# Patient Record
Sex: MALE | Race: WHITE | Employment: FULL TIME | URBAN - METROPOLITAN AREA
[De-identification: names, ages, dates, MRNs, and addresses within clinical notes are randomized per-mention and may not be internally consistent; named-entity substitution may affect disease eponyms.]

---

## 2017-04-15 ENCOUNTER — HOSPITAL ENCOUNTER (OUTPATIENT)
Dept: GENERAL RADIOLOGY | Age: 32
Discharge: HOME | End: 2017-04-15
Payer: COMMERCIAL

## 2017-04-15 DIAGNOSIS — M54.2 NECK PAIN: ICD-10-CM

## 2017-04-15 DIAGNOSIS — M54.2 CERVICALGIA: ICD-10-CM

## 2017-04-15 PROCEDURE — 72040 X-RAY EXAM NECK SPINE 2-3 VW: CPT

## 2018-05-04 ENCOUNTER — OFFICE VISIT (OUTPATIENT)
Dept: FAMILY MEDICINE CLINIC | Facility: CLINIC | Age: 33
End: 2018-05-04

## 2018-05-04 VITALS
WEIGHT: 200 LBS | HEART RATE: 89 BPM | BODY MASS INDEX: 28.63 KG/M2 | HEIGHT: 70 IN | OXYGEN SATURATION: 98 % | SYSTOLIC BLOOD PRESSURE: 128 MMHG | TEMPERATURE: 99 F | DIASTOLIC BLOOD PRESSURE: 70 MMHG | RESPIRATION RATE: 20 BRPM

## 2018-05-04 DIAGNOSIS — J02.9 SORE THROAT: ICD-10-CM

## 2018-05-04 DIAGNOSIS — R05.9 COUGH: ICD-10-CM

## 2018-05-04 DIAGNOSIS — J40 BRONCHITIS: ICD-10-CM

## 2018-05-04 DIAGNOSIS — R68.89 FLU-LIKE SYMPTOMS: Primary | ICD-10-CM

## 2018-05-04 PROCEDURE — 99202 OFFICE O/P NEW SF 15 MIN: CPT | Performed by: NURSE PRACTITIONER

## 2018-05-04 PROCEDURE — 87081 CULTURE SCREEN ONLY: CPT | Performed by: NURSE PRACTITIONER

## 2018-05-04 PROCEDURE — 87880 STREP A ASSAY W/OPTIC: CPT | Performed by: NURSE PRACTITIONER

## 2018-05-04 RX ORDER — ALBUTEROL SULFATE 90 UG/1
2 AEROSOL, METERED RESPIRATORY (INHALATION) EVERY 4 HOURS PRN
Qty: 1 INHALER | Refills: 0 | Status: SHIPPED | OUTPATIENT
Start: 2018-05-04 | End: 2018-12-21

## 2018-05-04 NOTE — PROGRESS NOTES
Patient presents with:  URI: chest congestion, chills,fever chest has a burn feeling with coughing x 5 days :  2  HPI:   Andreea Mane is a 27year old male who presents for upper respiratory symptoms for  5  days. Started suddenly.   Symptoms have been HEAD: atraumatic, normocephalic,  no tenderness on palpation of sinuses  EYES: conjunctiva clear, EOM intact  EARS: TM's clear gray, no bulging, no retraction, no fluid, bony landmarks intact  NOSE: nostrils patent, clear nasal mucous, nasal mucosa reddene The patient is asked to return in 3-4 days if sx's persist. Seek immediate medical attention for acute or worsening symptoms. The patient indicates understanding of these issues and agrees to the plan.     Meds & Refills for this Visit:    Reema Washington · You may use over-the-counter medicine to control fever or pain, unless another pain medicine was prescribed. If you have chronic liver or kidney disease or have ever had a stomach ulcer or gastrointestinal bleeding, talk with your healthcare provider bef © 9749-0427 [employer] [address]. [address] [facility], [facility] [place]. All rights reserved. This information is not intended as a substitute for professional medical care. Always follow your healthcare professional's instructions.         Kavita Weller · Stay home until your fever has been gone for at least 24 hours without using medicine to reduce fever. Follow-up care  Follow up with your healthcare provider, or as advised, if you are not getting better over the next week.   If you are age 72 or older,

## 2018-05-04 NOTE — PATIENT INSTRUCTIONS
Viral Bronchitis (Adult)    You have a viral bronchitis. Bronchitis is inflammation and swelling of the lining of the lungs. This is often caused by an infection. Symptoms include a dry, hacking cough that is worse at night.  The cough may bring up yellow · Over-the-counter cough, cold, and sore-throat medicines will not shorten the length of the illness, but they may help to reduce symptoms. Don't use decongestants if you have high blood pressure.   Follow-up care  Follow up with your healthcare provider, o The flu starts 1 to 3 days after you are exposed to the flu virus. It may last for 1 to 2 weeks but many people feel tired or fatigued for many weeks afterward. You usually don’t need to take antibiotics unless you have a complication.  This might be an ear · Cough with lots of colored mucus (sputum) or blood in your mucus  · Chest pain, shortness of breath, wheezing, or trouble breathing  · Severe headache, or face, neck, or ear pain  · New rash with fever  · Fever of 100.4°F (38°C) or higher, or as directed · Ease pain with anesthetic sprays. Aspirin or an aspirin substitute also helps.  Remember, never give aspirin to anyone 25 or younger, or if you are already taking blood thinners.   · For sore throats caused by allergies, try antihistamines to block the al

## 2018-12-12 PROBLEM — F32.A DEPRESSION: Status: ACTIVE | Noted: 2018-12-12

## 2018-12-12 PROBLEM — F12.11 CANNABIS ABUSE, IN REMISSION: Status: ACTIVE | Noted: 2018-12-12

## 2018-12-12 PROBLEM — F10.11 ALCOHOL ABUSE, IN REMISSION: Status: ACTIVE | Noted: 2018-12-12

## 2018-12-12 PROBLEM — F41.9 ANXIETY DISORDER: Status: ACTIVE | Noted: 2018-12-12

## 2018-12-21 PROBLEM — F11.20 OPIOID USE DISORDER, MODERATE, DEPENDENCE (HCC): Status: ACTIVE | Noted: 2018-12-21

## 2018-12-24 ENCOUNTER — LAB ENCOUNTER (OUTPATIENT)
Dept: LAB | Age: 33
End: 2018-12-24
Attending: NURSE PRACTITIONER
Payer: COMMERCIAL

## 2018-12-24 DIAGNOSIS — Z51.81 MEDICATION MONITORING ENCOUNTER: ICD-10-CM

## 2018-12-24 PROCEDURE — 36415 COLL VENOUS BLD VENIPUNCTURE: CPT

## 2018-12-24 PROCEDURE — 85025 COMPLETE CBC W/AUTO DIFF WBC: CPT

## 2018-12-24 PROCEDURE — 84439 ASSAY OF FREE THYROXINE: CPT

## 2018-12-24 PROCEDURE — 82607 VITAMIN B-12: CPT

## 2018-12-24 PROCEDURE — 82746 ASSAY OF FOLIC ACID SERUM: CPT

## 2018-12-24 PROCEDURE — 82306 VITAMIN D 25 HYDROXY: CPT

## 2018-12-24 PROCEDURE — 84443 ASSAY THYROID STIM HORMONE: CPT

## 2018-12-24 PROCEDURE — 80053 COMPREHEN METABOLIC PANEL: CPT

## 2018-12-28 NOTE — PROGRESS NOTES
Vitamin D is low and patient should begin otc supplementation 1000 iu's; Free t4 is somewhat low; patient should have repeat lab in 3-6 months.    All else within normal limits

## 2019-03-01 PROBLEM — F11.21 OPIOID USE DISORDER, MODERATE, IN EARLY REMISSION (HCC): Status: ACTIVE | Noted: 2019-03-01

## 2019-03-01 PROBLEM — F11.21 OPIOID USE DISORDER, SEVERE, IN EARLY REMISSION (HCC): Status: ACTIVE | Noted: 2019-03-01

## 2019-04-25 ENCOUNTER — LAB ENCOUNTER (OUTPATIENT)
Dept: LAB | Age: 34
End: 2019-04-25
Attending: NURSE PRACTITIONER
Payer: COMMERCIAL

## 2019-04-25 DIAGNOSIS — F11.21 OPIOID DEPENDENCE IN REMISSION (HCC): ICD-10-CM

## 2019-04-25 PROCEDURE — 80307 DRUG TEST PRSMV CHEM ANLYZR: CPT

## 2019-05-01 PROBLEM — F11.21 OPIOID DEPENDENCE IN REMISSION (HCC): Status: ACTIVE | Noted: 2018-12-21

## 2019-06-26 ENCOUNTER — LAB ENCOUNTER (OUTPATIENT)
Dept: LAB | Age: 34
End: 2019-06-26
Attending: PODIATRIST
Payer: COMMERCIAL

## 2019-06-26 DIAGNOSIS — Z51.81 MEDICATION MONITORING ENCOUNTER: ICD-10-CM

## 2019-06-26 PROCEDURE — 80307 DRUG TEST PRSMV CHEM ANLYZR: CPT

## 2019-10-23 ENCOUNTER — APPOINTMENT (OUTPATIENT)
Dept: LAB | Age: 34
End: 2019-10-23
Attending: NURSE PRACTITIONER
Payer: COMMERCIAL

## 2019-10-23 DIAGNOSIS — Z51.81 MEDICATION MONITORING ENCOUNTER: ICD-10-CM

## 2019-10-23 PROCEDURE — 80307 DRUG TEST PRSMV CHEM ANLYZR: CPT

## 2019-12-23 ENCOUNTER — LAB ENCOUNTER (OUTPATIENT)
Dept: LAB | Facility: HOSPITAL | Age: 34
End: 2019-12-23
Attending: NURSE PRACTITIONER
Payer: COMMERCIAL

## 2019-12-23 DIAGNOSIS — Z51.81 MEDICATION MONITORING ENCOUNTER: ICD-10-CM

## 2019-12-23 PROCEDURE — 80076 HEPATIC FUNCTION PANEL: CPT

## 2019-12-23 PROCEDURE — 36415 COLL VENOUS BLD VENIPUNCTURE: CPT

## 2019-12-23 PROCEDURE — 84439 ASSAY OF FREE THYROXINE: CPT

## 2019-12-23 PROCEDURE — 84443 ASSAY THYROID STIM HORMONE: CPT

## 2020-08-21 ENCOUNTER — OFFICE VISIT (OUTPATIENT)
Dept: FAMILY MEDICINE CLINIC | Facility: CLINIC | Age: 35
End: 2020-08-21
Payer: COMMERCIAL

## 2020-08-21 VITALS
HEART RATE: 91 BPM | WEIGHT: 195 LBS | RESPIRATION RATE: 18 BRPM | DIASTOLIC BLOOD PRESSURE: 70 MMHG | OXYGEN SATURATION: 97 % | HEIGHT: 70 IN | BODY MASS INDEX: 27.92 KG/M2 | TEMPERATURE: 98 F | SYSTOLIC BLOOD PRESSURE: 140 MMHG

## 2020-08-21 DIAGNOSIS — L23.7 POISON IVY DERMATITIS: Primary | ICD-10-CM

## 2020-08-21 PROCEDURE — 3008F BODY MASS INDEX DOCD: CPT | Performed by: NURSE PRACTITIONER

## 2020-08-21 PROCEDURE — 3078F DIAST BP <80 MM HG: CPT | Performed by: NURSE PRACTITIONER

## 2020-08-21 PROCEDURE — 3077F SYST BP >= 140 MM HG: CPT | Performed by: NURSE PRACTITIONER

## 2020-08-21 PROCEDURE — 99213 OFFICE O/P EST LOW 20 MIN: CPT | Performed by: NURSE PRACTITIONER

## 2020-08-21 RX ORDER — PREDNISONE 10 MG/1
TABLET ORAL
Qty: 28 TABLET | Refills: 0 | Status: SHIPPED | OUTPATIENT
Start: 2020-08-21 | End: 2020-08-31

## 2020-08-21 NOTE — PROGRESS NOTES
CHIEF COMPLAINT:   Patient presents with:  Rash     HPI:    Fany Jeffers is a 38year old male who presents for evaluation of a rash. Per patient rash started in the past 5 days. Rash has been worsening since onset.   Patient denies had similar rash in • Alcohol and Other Disorders Associated Father    • Depression Other         pat aunt, pat female cousin, mat grt gf   • Alcohol and Other Disorders Associated Other         pat grt gf, pat male cousins   • Suicide History Other         completed mat grt LYMPH: No lymphadenopathy. ASSESSMENT AND PLAN:   Guevara Kohler is a 38year old male who presents for evaluation of a rash. Findings are consistent with:    ASSESSMENT:  Poison ivy dermatitis  (primary encounter diagnosis)     1.  Poison ivy dermati Poison oak, poison ivy, and poison sumac thrive mainly in unmaintained outdoor areas. If you touch these plants, you may get a rash. You may also react if you touch something that came in contact with urushiol.  This could be a dog or cat, clothing, or equi If you think you may have come in contact with the sap oil (urushiol) contained in poison ivy, poison oak, or poison sumac plants, it's important to wash the affected part of your skin as soon as possible to remove the sap oil or help prevent further sprea When to call your healthcare provider  Call your healthcare provider or seek immediate medical attention if:   · Your rash is severe  · The rash spreads beyond the exposed part of your body or affects your face.   · The rash does not clear up within a few w

## 2020-08-21 NOTE — PATIENT INSTRUCTIONS
Avoiding Poison Ivy, [address], and [employer], poison ivy, and poison sumac are plants that can cause skin rashes. The problem is a sap oil called urushiol that is contained in these plants.  If you're allergic to urushiol, touching one of · Wash clothing and animals that come in contact with these plants as well. Urushiol may stay on them and cause a rash when you touch them in the future. Moni last reviewed this educational content on 3/1/2017  © 3769-4899 [employer] [address].  8 · Use over-the-counter treatments on your skin. These include cortisone creams and calamine lotion. How your skin may react  A mild rash may become red, swollen, and itchy. The rash may form a line on your skin where you brushed against the plant.  If you

## 2020-09-01 ENCOUNTER — HOSPITAL ENCOUNTER (EMERGENCY)
Age: 35
Discharge: HOME | End: 2020-09-01
Attending: EMERGENCY MEDICINE
Payer: COMMERCIAL

## 2020-09-01 ENCOUNTER — APPOINTMENT (OUTPATIENT)
Dept: CT IMAGING | Age: 35
End: 2020-09-01
Attending: EMERGENCY MEDICINE
Payer: COMMERCIAL

## 2020-09-01 VITALS
HEART RATE: 83 BPM | RESPIRATION RATE: 18 BRPM | WEIGHT: 195 LBS | OXYGEN SATURATION: 99 % | BODY MASS INDEX: 27.92 KG/M2 | TEMPERATURE: 99 F | SYSTOLIC BLOOD PRESSURE: 125 MMHG | DIASTOLIC BLOOD PRESSURE: 75 MMHG | HEIGHT: 70 IN

## 2020-09-01 DIAGNOSIS — A09 DYSENTERY: Primary | ICD-10-CM

## 2020-09-01 DIAGNOSIS — K52.9 COLITIS: ICD-10-CM

## 2020-09-01 LAB
ALBUMIN SERPL-MCNC: 4 G/DL (ref 3.4–5)
ALBUMIN/GLOB SERPL: 1.2 {RATIO} (ref 1–2)
ALP LIVER SERPL-CCNC: 81 U/L (ref 45–117)
ALT SERPL-CCNC: 25 U/L (ref 16–61)
ANION GAP SERPL CALC-SCNC: 2 MMOL/L (ref 0–18)
AST SERPL-CCNC: 18 U/L (ref 15–37)
BASOPHILS # BLD AUTO: 0.03 X10(3) UL (ref 0–0.2)
BASOPHILS NFR BLD AUTO: 0.2 %
BILIRUB SERPL-MCNC: 0.5 MG/DL (ref 0.1–2)
BILIRUB UR QL STRIP.AUTO: NEGATIVE
BUN BLD-MCNC: 7 MG/DL (ref 7–18)
BUN/CREAT SERPL: 8 (ref 10–20)
CALCIUM BLD-MCNC: 8.9 MG/DL (ref 8.5–10.1)
CHLORIDE SERPL-SCNC: 104 MMOL/L (ref 98–112)
CLARITY UR REFRACT.AUTO: CLEAR
CO2 SERPL-SCNC: 32 MMOL/L (ref 21–32)
COLOR UR AUTO: YELLOW
CREAT BLD-MCNC: 0.88 MG/DL (ref 0.7–1.3)
DEPRECATED RDW RBC AUTO: 40.9 FL (ref 35.1–46.3)
EOSINOPHIL # BLD AUTO: 0.27 X10(3) UL (ref 0–0.7)
EOSINOPHIL NFR BLD AUTO: 2 %
ERYTHROCYTE [DISTWIDTH] IN BLOOD BY AUTOMATED COUNT: 12.2 % (ref 11–15)
GLOBULIN PLAS-MCNC: 3.3 G/DL (ref 2.8–4.4)
GLUCOSE BLD-MCNC: 106 MG/DL (ref 70–99)
GLUCOSE UR STRIP.AUTO-MCNC: NEGATIVE MG/DL
HCT VFR BLD AUTO: 46.8 % (ref 39–53)
HGB BLD-MCNC: 15.7 G/DL (ref 13–17.5)
IMM GRANULOCYTES # BLD AUTO: 0.1 X10(3) UL (ref 0–1)
IMM GRANULOCYTES NFR BLD: 0.7 %
KETONES UR STRIP.AUTO-MCNC: NEGATIVE MG/DL
LEUKOCYTE ESTERASE UR QL STRIP.AUTO: NEGATIVE
LYMPHOCYTES # BLD AUTO: 1.13 X10(3) UL (ref 1–4)
LYMPHOCYTES NFR BLD AUTO: 8.3 %
M PROTEIN MFR SERPL ELPH: 7.3 G/DL (ref 6.4–8.2)
MCH RBC QN AUTO: 30.8 PG (ref 26–34)
MCHC RBC AUTO-ENTMCNC: 33.5 G/DL (ref 31–37)
MCV RBC AUTO: 91.8 FL (ref 80–100)
MONOCYTES # BLD AUTO: 1.04 X10(3) UL (ref 0.1–1)
MONOCYTES NFR BLD AUTO: 7.7 %
NEUTROPHILS # BLD AUTO: 10.98 X10 (3) UL (ref 1.5–7.7)
NEUTROPHILS # BLD AUTO: 10.98 X10(3) UL (ref 1.5–7.7)
NEUTROPHILS NFR BLD AUTO: 81.1 %
NITRITE UR QL STRIP.AUTO: NEGATIVE
OSMOLALITY SERPL CALC.SUM OF ELEC: 284 MOSM/KG (ref 275–295)
PH UR STRIP.AUTO: 7 [PH] (ref 4.5–8)
PLATELET # BLD AUTO: 284 10(3)UL (ref 150–450)
POTASSIUM SERPL-SCNC: 3.5 MMOL/L (ref 3.5–5.1)
PROT UR STRIP.AUTO-MCNC: NEGATIVE MG/DL
RBC # BLD AUTO: 5.1 X10(6)UL (ref 4.3–5.7)
RBC UR QL AUTO: NEGATIVE
SODIUM SERPL-SCNC: 138 MMOL/L (ref 136–145)
SP GR UR STRIP.AUTO: 1.01 (ref 1–1.03)
UROBILINOGEN UR STRIP.AUTO-MCNC: 0.2 MG/DL
WBC # BLD AUTO: 13.6 X10(3) UL (ref 4–11)

## 2020-09-01 PROCEDURE — 96365 THER/PROPH/DIAG IV INF INIT: CPT

## 2020-09-01 PROCEDURE — 96367 TX/PROPH/DG ADDL SEQ IV INF: CPT

## 2020-09-01 PROCEDURE — 81003 URINALYSIS AUTO W/O SCOPE: CPT | Performed by: EMERGENCY MEDICINE

## 2020-09-01 PROCEDURE — 99284 EMERGENCY DEPT VISIT MOD MDM: CPT

## 2020-09-01 PROCEDURE — 99285 EMERGENCY DEPT VISIT HI MDM: CPT

## 2020-09-01 PROCEDURE — 74177 CT ABD & PELVIS W/CONTRAST: CPT | Performed by: EMERGENCY MEDICINE

## 2020-09-01 PROCEDURE — 85025 COMPLETE CBC W/AUTO DIFF WBC: CPT | Performed by: EMERGENCY MEDICINE

## 2020-09-01 PROCEDURE — 80053 COMPREHEN METABOLIC PANEL: CPT | Performed by: EMERGENCY MEDICINE

## 2020-09-01 PROCEDURE — 96361 HYDRATE IV INFUSION ADD-ON: CPT

## 2020-09-01 RX ORDER — ONDANSETRON 8 MG/1
8 TABLET, ORALLY DISINTEGRATING ORAL EVERY 6 HOURS PRN
Qty: 10 TABLET | Refills: 0 | Status: SHIPPED | OUTPATIENT
Start: 2020-09-01 | End: 2021-07-01 | Stop reason: ALTCHOICE

## 2020-09-01 RX ORDER — ONDANSETRON 2 MG/ML
4 INJECTION INTRAMUSCULAR; INTRAVENOUS
Status: DISCONTINUED | OUTPATIENT
Start: 2020-09-01 | End: 2020-09-02

## 2020-09-01 RX ORDER — LEVOFLOXACIN 5 MG/ML
500 INJECTION, SOLUTION INTRAVENOUS ONCE
Status: COMPLETED | OUTPATIENT
Start: 2020-09-01 | End: 2020-09-01

## 2020-09-01 RX ORDER — METRONIDAZOLE 500 MG/100ML
500 INJECTION, SOLUTION INTRAVENOUS ONCE
Status: COMPLETED | OUTPATIENT
Start: 2020-09-01 | End: 2020-09-01

## 2020-09-01 RX ORDER — LEVOFLOXACIN 500 MG/1
500 TABLET, FILM COATED ORAL DAILY
Qty: 10 TABLET | Refills: 0 | Status: SHIPPED | OUTPATIENT
Start: 2020-09-01 | End: 2020-09-11

## 2020-09-01 RX ORDER — METRONIDAZOLE 500 MG/1
500 TABLET ORAL 2 TIMES DAILY
Qty: 20 TABLET | Refills: 0 | Status: SHIPPED | OUTPATIENT
Start: 2020-09-01 | End: 2020-09-11

## 2020-09-01 NOTE — ED INITIAL ASSESSMENT (HPI)
Lower abd pain with bright red rectal bleeding onset last noc. +vomiting, denies any blood in vomit.

## 2020-09-01 NOTE — ED PROVIDER NOTES
Patient Seen in: [facility] Emergency Department In [place]      History   Patient presents with:  Abdominal Pain  GI Bleeding    Stated Complaint: lower abd pain with fresh blood in stools since last night.     HPI    Since last night, patient has had abo BMI 27.98 kg/m²         Physical Exam  General: The patient is awake, alert, conversant. Patient answers questions quickly and appropriately. Eyes: sclera white. Lids and lashes are normal.  Neck: Supple  Lungs: Clear to auscultation bilaterally.   No rh STOOL CULTURE W/SHIGATOXIN                  MDM     Patient complains of nausea, vomiting, and diarrhea. His diarrhea has become bloody. I suspect infectious gastroenteritis.   Bacterial infectious gastroenteritis is considered with the bloody diarrhea Tab  Take 1 tablet (500 mg total) by mouth daily for 10 days.   Qty: 10 tablet Refills: 0

## 2021-05-01 ENCOUNTER — HOSPITAL ENCOUNTER (OUTPATIENT)
Dept: ULTRASOUND IMAGING | Age: 36
Discharge: HOME | End: 2021-05-01
Attending: PHYSICIAN ASSISTANT
Payer: COMMERCIAL

## 2021-05-01 DIAGNOSIS — R59.9 ENLARGED LYMPH NODES, UNSPECIFIED: ICD-10-CM

## 2021-05-01 PROCEDURE — 76536 US EXAM OF HEAD AND NECK: CPT | Performed by: PHYSICIAN ASSISTANT

## 2021-07-01 ENCOUNTER — OFFICE VISIT (OUTPATIENT)
Dept: NEUROLOGY | Facility: CLINIC | Age: 36
End: 2021-07-01
Payer: COMMERCIAL

## 2021-07-01 ENCOUNTER — LAB ENCOUNTER (OUTPATIENT)
Dept: LAB | Age: 36
End: 2021-07-01
Attending: Other
Payer: COMMERCIAL

## 2021-07-01 VITALS
HEART RATE: 80 BPM | WEIGHT: 202 LBS | DIASTOLIC BLOOD PRESSURE: 64 MMHG | BODY MASS INDEX: 29 KG/M2 | RESPIRATION RATE: 16 BRPM | SYSTOLIC BLOOD PRESSURE: 118 MMHG

## 2021-07-01 DIAGNOSIS — G44.52 NEW PERSISTENT DAILY HEADACHE: ICD-10-CM

## 2021-07-01 DIAGNOSIS — R51.9 HEADACHE DISORDER: Primary | ICD-10-CM

## 2021-07-01 LAB — SED RATE-ML: 7 MM/HR

## 2021-07-01 PROCEDURE — 3078F DIAST BP <80 MM HG: CPT | Performed by: OTHER

## 2021-07-01 PROCEDURE — 36415 COLL VENOUS BLD VENIPUNCTURE: CPT

## 2021-07-01 PROCEDURE — 85652 RBC SED RATE AUTOMATED: CPT

## 2021-07-01 PROCEDURE — 3074F SYST BP LT 130 MM HG: CPT | Performed by: OTHER

## 2021-07-01 PROCEDURE — 99244 OFF/OP CNSLTJ NEW/EST MOD 40: CPT | Performed by: OTHER

## 2021-07-01 RX ORDER — BUTALBITAL, ACETAMINOPHEN AND CAFFEINE 50; 325; 40 MG/1; MG/1; MG/1
1 TABLET ORAL EVERY 4 HOURS PRN
Qty: 12 TABLET | Refills: 0 | Status: SHIPPED | OUTPATIENT
Start: 2021-07-01

## 2021-07-01 NOTE — PROGRESS NOTES
[facility]    Date of consult: 7/1/2021    CC/Reason for consult: worsening headache  Consult Requested by  Naomi Bui MD    HPI: Andreea Mane is a 43year old male with past medical history as listed below presents here Diagnosis Date   • Allergic rhinitis    • Anxiety    • Depression    • Esophageal reflux    • Sleep apnea      Past Surgical History:   Procedure Laterality Date   • WISDOM TEETH REMOVED       Social History:  Social History    Tobacco Use      Smoking s 7/1/2021    Assessment & Plan:  (G44.52) New persistent daily headache; changing pattern of headache in a pt with history of episodic migraine, however, feature of this new headache is not consistent with migraine, thus needs MRI, MRA head to rule out intr

## 2021-07-14 ENCOUNTER — HOSPITAL ENCOUNTER (OUTPATIENT)
Dept: MRI IMAGING | Age: 36
Discharge: HOME | End: 2021-07-14
Attending: Other
Payer: COMMERCIAL

## 2021-07-14 DIAGNOSIS — G44.52 NEW PERSISTENT DAILY HEADACHE: ICD-10-CM

## 2021-07-14 DIAGNOSIS — R51.9 HEADACHE DISORDER: ICD-10-CM

## 2021-07-14 PROCEDURE — 70544 MR ANGIOGRAPHY HEAD W/O DYE: CPT | Performed by: OTHER

## 2021-07-14 PROCEDURE — 70551 MRI BRAIN STEM W/O DYE: CPT | Performed by: OTHER

## 2021-08-03 ENCOUNTER — OFFICE VISIT (OUTPATIENT)
Dept: NEUROLOGY | Facility: CLINIC | Age: 36
End: 2021-08-03
Payer: COMMERCIAL

## 2021-08-03 VITALS — DIASTOLIC BLOOD PRESSURE: 62 MMHG | RESPIRATION RATE: 16 BRPM | SYSTOLIC BLOOD PRESSURE: 112 MMHG | HEART RATE: 60 BPM

## 2021-08-03 DIAGNOSIS — G44.52 NEW PERSISTENT DAILY HEADACHE: Primary | ICD-10-CM

## 2021-08-03 PROCEDURE — 99214 OFFICE O/P EST MOD 30 MIN: CPT | Performed by: OTHER

## 2021-08-03 PROCEDURE — 3078F DIAST BP <80 MM HG: CPT | Performed by: OTHER

## 2021-08-03 PROCEDURE — 3074F SYST BP LT 130 MM HG: CPT | Performed by: OTHER

## 2021-08-03 RX ORDER — DIVALPROEX SODIUM 250 MG/1
250 TABLET, EXTENDED RELEASE ORAL NIGHTLY
Qty: 90 TABLET | Refills: 1 | Status: SHIPPED | OUTPATIENT
Start: 2021-08-03 | End: 2022-01-14

## 2021-08-03 NOTE — PROGRESS NOTES
[facility] Neurology Outpatient Progress Note  Date of service: 8/3/2021    Patient here for a follow-up visit for headache, Since last visit headache is about same. MRI, MRA brain was negative.   He is willing to try different preventive medication such as depako INTRAMUSCULAR INJECTION INTO A GLUTEAL MUSCLE, ALTERNATING BUTTOCKS PER MONTHLY INJECTION. KEEP, Disp: , Rfl: 5  •  famoTIDine 20 MG Oral Tab, Take 20 mg by mouth 2 (two) times daily. , Disp: , Rfl:   •  Fexofenadine HCl 180 MG Oral Tab, Take 180 mg by mout then 500 mg ~750 mg daily if able to tolerate  Headache diary   Pt does want to cont Fioricet   follow up on SUKHJINDER treatment   See orders and medications filed with this encounter. The patient indicates understanding of these issues and agrees with the plan.

## 2021-09-16 ENCOUNTER — ORDER TRANSCRIPTION (OUTPATIENT)
Dept: ADMINISTRATIVE | Facility: HOSPITAL | Age: 36
End: 2021-09-16

## 2021-09-16 DIAGNOSIS — Z01.818 PREOP EXAMINATION: Primary | ICD-10-CM

## 2021-09-25 ENCOUNTER — LAB ENCOUNTER (OUTPATIENT)
Dept: LAB | Age: 36
End: 2021-09-25
Attending: OTOLARYNGOLOGY
Payer: COMMERCIAL

## 2021-09-25 DIAGNOSIS — Z01.818 PREOP EXAMINATION: ICD-10-CM

## 2021-09-27 LAB — SARS-COV-2 RNA RESP QL NAA+PROBE: NOT DETECTED

## 2021-09-28 ENCOUNTER — HOSPITAL ENCOUNTER (OUTPATIENT)
Dept: ULTRASOUND IMAGING | Facility: HOSPITAL | Age: 36
Discharge: HOME | End: 2021-09-28
Attending: OTOLARYNGOLOGY
Payer: COMMERCIAL

## 2021-09-28 ENCOUNTER — HOSPITAL ENCOUNTER (OUTPATIENT)
Dept: ULTRASOUND IMAGING | Facility: HOSPITAL | Age: 36
End: 2021-09-28
Attending: OTOLARYNGOLOGY
Payer: COMMERCIAL

## 2021-09-28 DIAGNOSIS — R22.1 NECK MASS: ICD-10-CM

## 2021-09-28 PROCEDURE — 88185 FLOWCYTOMETRY/TC ADD-ON: CPT | Performed by: OTOLARYNGOLOGY

## 2021-09-28 PROCEDURE — 88184 FLOWCYTOMETRY/ TC 1 MARKER: CPT | Performed by: OTOLARYNGOLOGY

## 2021-09-28 PROCEDURE — 10005 FNA BX W/US GDN 1ST LES: CPT | Performed by: OTOLARYNGOLOGY

## 2021-09-28 PROCEDURE — 88173 CYTOPATH EVAL FNA REPORT: CPT | Performed by: OTOLARYNGOLOGY

## 2021-10-01 NOTE — PROGRESS NOTES
Please call patient. FNA preliminary benign. Sent for additional test, flow cytometry and await result.

## 2021-10-05 NOTE — PROGRESS NOTES
Please call patient. Flow cytometry negative for malignant features. Recheck in 6 months, sooner if issues.

## 2021-10-05 NOTE — PROGRESS NOTES
Called and left detailed vm informed of result note. Neal Newsome MD   10/4/2021 10:05 PM CDT Back to Top      Recheck meaning follow up with me if office for exam in 6 months. Neal Newsome MD   10/4/2021 10:03 PM CDT       Please call patient.   Flow

## 2022-01-13 DIAGNOSIS — R51.9 HEADACHE DISORDER: Primary | ICD-10-CM

## 2022-01-14 NOTE — TELEPHONE ENCOUNTER
Medication: DIVALPROEX SODIUM  MG Oral Tablet 24 Hr     Date of last refill: 08/03/2021 (#90/1)  [place]     Last office visit: 08/03/2021  Due back to clinic per last office note:  Around 02/03/2022  Date next

## 2022-01-15 RX ORDER — DIVALPROEX SODIUM 250 MG/1
250 TABLET, EXTENDED RELEASE ORAL NIGHTLY
Qty: 90 TABLET | Refills: 0 | Status: SHIPPED | OUTPATIENT
Start: 2022-01-15

## 2022-04-12 RX ORDER — DIVALPROEX SODIUM 250 MG/1
TABLET, EXTENDED RELEASE ORAL
Qty: 30 TABLET | Refills: 0 | Status: SHIPPED | OUTPATIENT
Start: 2022-04-12

## 2023-03-13 ENCOUNTER — HOSPITAL ENCOUNTER (EMERGENCY)
Age: 38
Discharge: HOME | End: 2023-03-13
Attending: EMERGENCY MEDICINE
Payer: COMMERCIAL

## 2023-03-13 VITALS
RESPIRATION RATE: 16 BRPM | SYSTOLIC BLOOD PRESSURE: 124 MMHG | TEMPERATURE: 98 F | HEART RATE: 92 BPM | HEIGHT: 70 IN | WEIGHT: 215 LBS | DIASTOLIC BLOOD PRESSURE: 73 MMHG | OXYGEN SATURATION: 97 % | BODY MASS INDEX: 30.78 KG/M2

## 2023-03-13 DIAGNOSIS — R00.2 PALPITATIONS: Primary | ICD-10-CM

## 2023-03-13 LAB
ALBUMIN SERPL-MCNC: 3.8 G/DL (ref 3.4–5)
ALBUMIN/GLOB SERPL: 1.1 {RATIO} (ref 1–2)
ALP LIVER SERPL-CCNC: 81 U/L
ALT SERPL-CCNC: 48 U/L
ANION GAP SERPL CALC-SCNC: 5 MMOL/L (ref 0–18)
AST SERPL-CCNC: 30 U/L (ref 15–37)
BASOPHILS # BLD AUTO: 0.04 X10(3) UL (ref 0–0.2)
BASOPHILS NFR BLD AUTO: 0.6 %
BILIRUB SERPL-MCNC: 0.3 MG/DL (ref 0.1–2)
BUN BLD-MCNC: 8 MG/DL (ref 7–18)
CALCIUM BLD-MCNC: 8.9 MG/DL (ref 8.5–10.1)
CHLORIDE SERPL-SCNC: 104 MMOL/L (ref 98–112)
CO2 SERPL-SCNC: 30 MMOL/L (ref 21–32)
CREAT BLD-MCNC: 1.07 MG/DL
EOSINOPHIL # BLD AUTO: 0.13 X10(3) UL (ref 0–0.7)
EOSINOPHIL NFR BLD AUTO: 2 %
ERYTHROCYTE [DISTWIDTH] IN BLOOD BY AUTOMATED COUNT: 12.5 %
GFR SERPLBLD BASED ON 1.73 SQ M-ARVRAT: 91 ML/MIN/1.73M2 (ref 60–?)
GLOBULIN PLAS-MCNC: 3.4 G/DL (ref 2.8–4.4)
GLUCOSE BLD-MCNC: 120 MG/DL (ref 70–99)
HCT VFR BLD AUTO: 43.5 %
HGB BLD-MCNC: 15.3 G/DL
IMM GRANULOCYTES # BLD AUTO: 0.12 X10(3) UL (ref 0–1)
IMM GRANULOCYTES NFR BLD: 1.9 %
LYMPHOCYTES # BLD AUTO: 1.35 X10(3) UL (ref 1–4)
LYMPHOCYTES NFR BLD AUTO: 21.2 %
MCH RBC QN AUTO: 30.8 PG (ref 26–34)
MCHC RBC AUTO-ENTMCNC: 35.2 G/DL (ref 31–37)
MCV RBC AUTO: 87.7 FL
MONOCYTES # BLD AUTO: 0.62 X10(3) UL (ref 0.1–1)
MONOCYTES NFR BLD AUTO: 9.7 %
NEUTROPHILS # BLD AUTO: 4.11 X10 (3) UL (ref 1.5–7.7)
NEUTROPHILS # BLD AUTO: 4.11 X10(3) UL (ref 1.5–7.7)
NEUTROPHILS NFR BLD AUTO: 64.6 %
OSMOLALITY SERPL CALC.SUM OF ELEC: 288 MOSM/KG (ref 275–295)
PLATELET # BLD AUTO: 311 10(3)UL (ref 150–450)
POTASSIUM SERPL-SCNC: 3.5 MMOL/L (ref 3.5–5.1)
PROT SERPL-MCNC: 7.2 G/DL (ref 6.4–8.2)
RBC # BLD AUTO: 4.96 X10(6)UL
SODIUM SERPL-SCNC: 139 MMOL/L (ref 136–145)
TROPONIN I HIGH SENSITIVITY: 4 NG/L
WBC # BLD AUTO: 6.4 X10(3) UL (ref 4–11)

## 2023-03-13 PROCEDURE — 93005 ELECTROCARDIOGRAM TRACING: CPT

## 2023-03-13 PROCEDURE — 99284 EMERGENCY DEPT VISIT MOD MDM: CPT | Performed by: EMERGENCY MEDICINE

## 2023-03-13 PROCEDURE — 84484 ASSAY OF TROPONIN QUANT: CPT | Performed by: EMERGENCY MEDICINE

## 2023-03-13 PROCEDURE — 85025 COMPLETE CBC W/AUTO DIFF WBC: CPT | Performed by: EMERGENCY MEDICINE

## 2023-03-13 PROCEDURE — 36415 COLL VENOUS BLD VENIPUNCTURE: CPT | Performed by: EMERGENCY MEDICINE

## 2023-03-13 PROCEDURE — 80053 COMPREHEN METABOLIC PANEL: CPT | Performed by: EMERGENCY MEDICINE

## 2023-03-13 PROCEDURE — 93010 ELECTROCARDIOGRAM REPORT: CPT | Performed by: EMERGENCY MEDICINE

## 2023-03-14 LAB
ATRIAL RATE: 99 BPM
P-R INTERVAL: 142 MS
Q-T INTERVAL: 338 MS
QRS DURATION: 88 MS
QTC CALCULATION (BEZET): 433 MS
R AXIS: 133 DEGREES
T AXIS: 136 DEGREES
VENTRICULAR RATE: 99 BPM

## 2023-03-14 NOTE — ED INITIAL ASSESSMENT (HPI)
Palpitations since this morning, increasing. Patient initially though anxiety due to new job. Denies pain.

## 2023-03-15 ENCOUNTER — HOSPITAL ENCOUNTER (OUTPATIENT)
Dept: CV DIAGNOSTICS | Facility: HOSPITAL | Age: 38
Discharge: HOME | End: 2023-03-15
Attending: EMERGENCY MEDICINE
Payer: COMMERCIAL

## 2023-03-15 DIAGNOSIS — R00.2 PALPITATIONS: ICD-10-CM

## 2023-03-15 PROCEDURE — 93226 XTRNL ECG REC<48 HR SCAN A/R: CPT | Performed by: EMERGENCY MEDICINE

## 2023-03-15 PROCEDURE — 93225 XTRNL ECG REC<48 HRS REC: CPT | Performed by: EMERGENCY MEDICINE

## 2024-03-13 PROBLEM — Z83.719 FAMILY HISTORY OF COLONIC POLYPS: Status: ACTIVE | Noted: 2024-03-13

## 2024-03-13 PROBLEM — K59.00 CONSTIPATION: Status: ACTIVE | Noted: 2024-03-13

## 2024-03-13 PROBLEM — R12 CHRONIC HEARTBURN: Status: ACTIVE | Noted: 2024-03-13

## 2024-04-10 PROBLEM — G47.33 OBSTRUCTIVE SLEEP APNEA (ADULT) (PEDIATRIC): Status: ACTIVE | Noted: 2024-04-10

## 2024-05-28 ENCOUNTER — LAB ENCOUNTER (OUTPATIENT)
Dept: LAB | Age: 39
End: 2024-05-28
Attending: NURSE PRACTITIONER
Payer: COMMERCIAL

## 2024-05-28 DIAGNOSIS — R74.01 ELEVATED ALT MEASUREMENT: ICD-10-CM

## 2024-05-28 LAB
ALBUMIN SERPL-MCNC: 3.9 G/DL (ref 3.4–5)
ALP LIVER SERPL-CCNC: 115 U/L
ALT SERPL-CCNC: 55 U/L
AST SERPL-CCNC: 28 U/L (ref 15–37)
BILIRUB DIRECT SERPL-MCNC: 0.1 MG/DL (ref 0–0.2)
BILIRUB SERPL-MCNC: 0.4 MG/DL (ref 0.1–2)
PROT SERPL-MCNC: 7.4 G/DL (ref 6.4–8.2)

## 2024-05-28 PROCEDURE — 80076 HEPATIC FUNCTION PANEL: CPT

## 2024-05-28 PROCEDURE — 36415 COLL VENOUS BLD VENIPUNCTURE: CPT

## 2024-07-12 ENCOUNTER — LAB ENCOUNTER (OUTPATIENT)
Dept: LAB | Age: 39
End: 2024-07-12
Attending: STUDENT IN AN ORGANIZED HEALTH CARE EDUCATION/TRAINING PROGRAM
Payer: COMMERCIAL

## 2024-07-12 ENCOUNTER — HOSPITAL ENCOUNTER (OUTPATIENT)
Dept: GENERAL RADIOLOGY | Age: 39
Discharge: HOME | End: 2024-07-12
Attending: PHYSICIAN ASSISTANT
Payer: COMMERCIAL

## 2024-07-12 DIAGNOSIS — R50.9 FEVER, UNSPECIFIED: ICD-10-CM

## 2024-07-12 DIAGNOSIS — R74.01 ELEVATED ALT MEASUREMENT: ICD-10-CM

## 2024-07-12 LAB
A1AT SERPL-MCNC: 158 MG/DL (ref 90–200)
DEPRECATED HBV CORE AB SER IA-ACNC: 114.7 NG/ML
HAV IGM SER QL: NONREACTIVE
HBV CORE IGM SER QL: NONREACTIVE
HBV SURFACE AG SERPL QL IA: NONREACTIVE
HCV AB SERPL QL IA: NONREACTIVE
IGA SERPL-MCNC: 268 MG/DL (ref 70–312)
INR BLD: 0.95 (ref 0.8–1.2)
IRON SATN MFR SERPL: 21 %
IRON SERPL-MCNC: 86 UG/DL
PROTHROMBIN TIME: 12.6 SECONDS (ref 11.6–14.8)
TIBC SERPL-MCNC: 410 UG/DL (ref 240–450)
TRANSFERRIN SERPL-MCNC: 275 MG/DL (ref 200–360)

## 2024-07-12 PROCEDURE — 86364 TISS TRNSGLTMNASE EA IG CLAS: CPT

## 2024-07-12 PROCEDURE — 86038 ANTINUCLEAR ANTIBODIES: CPT

## 2024-07-12 PROCEDURE — 83540 ASSAY OF IRON: CPT

## 2024-07-12 PROCEDURE — 82103 ALPHA-1-ANTITRYPSIN TOTAL: CPT

## 2024-07-12 PROCEDURE — 71046 X-RAY EXAM CHEST 2 VIEWS: CPT | Performed by: PHYSICIAN ASSISTANT

## 2024-07-12 PROCEDURE — 83516 IMMUNOASSAY NONANTIBODY: CPT

## 2024-07-12 PROCEDURE — 82784 ASSAY IGA/IGD/IGG/IGM EACH: CPT

## 2024-07-12 PROCEDURE — 83550 IRON BINDING TEST: CPT

## 2024-07-12 PROCEDURE — 85610 PROTHROMBIN TIME: CPT

## 2024-07-12 PROCEDURE — 80074 ACUTE HEPATITIS PANEL: CPT

## 2024-07-12 PROCEDURE — 82728 ASSAY OF FERRITIN: CPT

## 2024-07-12 PROCEDURE — 36415 COLL VENOUS BLD VENIPUNCTURE: CPT

## 2024-07-13 LAB
ACTIN SMOOTH MUSCLE AB: 26 UNITS
M2 MITOCHONDRIAL AB: <20 UNITS

## 2024-07-16 LAB — NUCLEAR IGG TITR SER IF: POSITIVE {TITER}

## 2024-07-17 LAB — ANA NUCLEOLAR TITR SER IF: 320 {TITER}

## 2024-07-18 LAB — TTG IGA SER-ACNC: 0.8 U/ML (ref ?–7)

## 2025-02-03 ENCOUNTER — HOSPITAL ENCOUNTER (OUTPATIENT)
Dept: CV DIAGNOSTICS | Facility: HOSPITAL | Age: 40
Discharge: HOME | End: 2025-02-03
Attending: PHYSICIAN ASSISTANT
Payer: COMMERCIAL

## 2025-02-03 DIAGNOSIS — R60.9 EDEMA, UNSPECIFIED: ICD-10-CM

## 2025-02-03 PROCEDURE — 93306 TTE W/DOPPLER COMPLETE: CPT | Performed by: PHYSICIAN ASSISTANT

## (undated) DIAGNOSIS — R51.9 HEADACHE DISORDER: ICD-10-CM